# Patient Record
Sex: FEMALE | Race: WHITE | Employment: OTHER | URBAN - METROPOLITAN AREA
[De-identification: names, ages, dates, MRNs, and addresses within clinical notes are randomized per-mention and may not be internally consistent; named-entity substitution may affect disease eponyms.]

---

## 2023-06-02 ENCOUNTER — LAB REQUISITION (OUTPATIENT)
Dept: LAB | Facility: CLINIC | Age: 78
End: 2023-06-02
Payer: COMMERCIAL

## 2023-06-02 DIAGNOSIS — Z79.899 OTHER LONG TERM (CURRENT) DRUG THERAPY: ICD-10-CM

## 2023-06-02 LAB
ALBUMIN SERPL BCG-MCNC: 4.6 G/DL (ref 3.5–5.2)
ALP SERPL-CCNC: 70 U/L (ref 35–104)
ALT SERPL W P-5'-P-CCNC: 24 U/L (ref 10–35)
AST SERPL W P-5'-P-CCNC: 33 U/L (ref 10–35)
BASOPHILS # BLD AUTO: 0.1 10E3/UL (ref 0–0.2)
BASOPHILS NFR BLD AUTO: 1 %
BILIRUB DIRECT SERPL-MCNC: 0.25 MG/DL (ref 0–0.3)
BILIRUB SERPL-MCNC: 1.1 MG/DL
EOSINOPHIL # BLD AUTO: 0.4 10E3/UL (ref 0–0.7)
EOSINOPHIL NFR BLD AUTO: 5 %
ERYTHROCYTE [DISTWIDTH] IN BLOOD BY AUTOMATED COUNT: 13.9 % (ref 10–15)
HCT VFR BLD AUTO: 37.3 % (ref 35–47)
HGB BLD-MCNC: 12.4 G/DL (ref 11.7–15.7)
IMM GRANULOCYTES # BLD: 0 10E3/UL
IMM GRANULOCYTES NFR BLD: 0 %
LYMPHOCYTES # BLD AUTO: 2.1 10E3/UL (ref 0.8–5.3)
LYMPHOCYTES NFR BLD AUTO: 30 %
MCH RBC QN AUTO: 31.6 PG (ref 26.5–33)
MCHC RBC AUTO-ENTMCNC: 33.2 G/DL (ref 31.5–36.5)
MCV RBC AUTO: 95 FL (ref 78–100)
MONOCYTES # BLD AUTO: 0.5 10E3/UL (ref 0–1.3)
MONOCYTES NFR BLD AUTO: 8 %
NEUTROPHILS # BLD AUTO: 3.9 10E3/UL (ref 1.6–8.3)
NEUTROPHILS NFR BLD AUTO: 56 %
NRBC # BLD AUTO: 0 10E3/UL
NRBC BLD AUTO-RTO: 0 /100
PLATELET # BLD AUTO: 255 10E3/UL (ref 150–450)
PROT SERPL-MCNC: 6.6 G/DL (ref 6.4–8.3)
RBC # BLD AUTO: 3.92 10E6/UL (ref 3.8–5.2)
WBC # BLD AUTO: 7 10E3/UL (ref 4–11)

## 2023-06-02 PROCEDURE — 80076 HEPATIC FUNCTION PANEL: CPT | Mod: ORL | Performed by: DERMATOLOGY

## 2023-06-02 PROCEDURE — 85025 COMPLETE CBC W/AUTO DIFF WBC: CPT | Mod: ORL | Performed by: DERMATOLOGY

## 2024-01-15 ENCOUNTER — PRE VISIT (OUTPATIENT)
Dept: NEUROLOGY | Facility: CLINIC | Age: 79
End: 2024-01-15

## 2024-03-20 ENCOUNTER — TRANSCRIBE ORDERS (OUTPATIENT)
Dept: OTHER | Age: 79
End: 2024-03-20

## 2024-03-20 DIAGNOSIS — G60.8 PERIPHERAL SENSORY NEUROPATHY: ICD-10-CM

## 2024-03-20 DIAGNOSIS — R26.9 GAIT ABNORMALITY: ICD-10-CM

## 2024-03-20 DIAGNOSIS — R26.89 BALANCE PROBLEM: Primary | ICD-10-CM

## 2024-03-20 DIAGNOSIS — R42 VERTIGO: ICD-10-CM

## 2024-05-25 ENCOUNTER — HEALTH MAINTENANCE LETTER (OUTPATIENT)
Age: 79
End: 2024-05-25

## 2024-06-18 NOTE — TELEPHONE ENCOUNTER
Records Requested     June 18, 2024 9:36 AM   [id number]   Facility  [facility]   Outcome 9:37 am Sent request for imaging to be pushed to PACS. -MIRELA Steinberg on 7/10/2024 at 10:54 AM Imaging resolved into PACS. -MIRELA     RECORDS RECEIVED FROM: [facility]   REASON FOR VISIT: Vertigo, Balance problems   PROVIDER: Mk Lazo DO   DATE OF APPT: 8/16/24 @ 10:00 am    NOTES (FOR ALL VISITS) STATUS DETAILS   OFFICE NOTE from referring provider [facility] 3/19/24, 9/29/23, 6/21/23 Kacie Phoenix MD @[facility]     MEDICATION LIST Internal    IMAGING  (FOR ALL VISITS)     CT (HEAD, NECK, SPINE) PACS [facility]  3/19/24 CT Sinus [facility]

## 2024-07-18 ENCOUNTER — TRANSFERRED RECORDS (OUTPATIENT)
Dept: HEALTH INFORMATION MANAGEMENT | Facility: CLINIC | Age: 79
End: 2024-07-18

## 2024-08-06 ENCOUNTER — TRANSFERRED RECORDS (OUTPATIENT)
Dept: HEALTH INFORMATION MANAGEMENT | Facility: CLINIC | Age: 79
End: 2024-08-06
Payer: COMMERCIAL

## 2024-08-16 ENCOUNTER — OFFICE VISIT (OUTPATIENT)
Dept: NEUROLOGY | Facility: CLINIC | Age: 79
End: 2024-08-16
Payer: COMMERCIAL

## 2024-08-16 ENCOUNTER — LAB (OUTPATIENT)
Dept: LAB | Facility: CLINIC | Age: 79
End: 2024-08-16
Payer: COMMERCIAL

## 2024-08-16 ENCOUNTER — PRE VISIT (OUTPATIENT)
Dept: NEUROLOGY | Facility: CLINIC | Age: 79
End: 2024-08-16

## 2024-08-16 VITALS
OXYGEN SATURATION: 98 % | WEIGHT: 122.2 LBS | HEART RATE: 66 BPM | SYSTOLIC BLOOD PRESSURE: 136 MMHG | DIASTOLIC BLOOD PRESSURE: 81 MMHG

## 2024-08-16 DIAGNOSIS — G25.81 RESTLESS LEGS SYNDROME (RLS): ICD-10-CM

## 2024-08-16 DIAGNOSIS — G62.9 NEUROPATHY: ICD-10-CM

## 2024-08-16 DIAGNOSIS — R79.9 ABNORMAL FINDING OF BLOOD CHEMISTRY, UNSPECIFIED: ICD-10-CM

## 2024-08-16 DIAGNOSIS — M20.41 HAMMER TOES OF BOTH FEET: ICD-10-CM

## 2024-08-16 DIAGNOSIS — R42 VERTIGO: ICD-10-CM

## 2024-08-16 DIAGNOSIS — R26.9 GAIT ABNORMALITY: ICD-10-CM

## 2024-08-16 DIAGNOSIS — G60.3 IDIOPATHIC PROGRESSIVE NEUROPATHY: ICD-10-CM

## 2024-08-16 DIAGNOSIS — R26.89 BALANCE PROBLEM: ICD-10-CM

## 2024-08-16 DIAGNOSIS — R27.8 OTHER LACK OF COORDINATION: ICD-10-CM

## 2024-08-16 DIAGNOSIS — G60.8 PERIPHERAL SENSORY NEUROPATHY: ICD-10-CM

## 2024-08-16 DIAGNOSIS — G61.1 SERUM NEUROPATHY (H): ICD-10-CM

## 2024-08-16 DIAGNOSIS — G62.9 NEUROPATHY: Primary | ICD-10-CM

## 2024-08-16 DIAGNOSIS — M20.42 HAMMER TOES OF BOTH FEET: ICD-10-CM

## 2024-08-16 LAB
BASOPHILS # BLD AUTO: 0 10E3/UL (ref 0–0.2)
BASOPHILS NFR BLD AUTO: 1 %
EOSINOPHIL # BLD AUTO: 0.2 10E3/UL (ref 0–0.7)
EOSINOPHIL NFR BLD AUTO: 3 %
ERYTHROCYTE [DISTWIDTH] IN BLOOD BY AUTOMATED COUNT: 15.2 % (ref 10–15)
FERRITIN SERPL-MCNC: 148 NG/ML (ref 11–328)
HCT VFR BLD AUTO: 40.6 % (ref 35–47)
HGB BLD-MCNC: 14.2 G/DL (ref 11.7–15.7)
IMM GRANULOCYTES # BLD: 0 10E3/UL
IMM GRANULOCYTES NFR BLD: 0 %
IRON BINDING CAPACITY (ROCHE): 379 UG/DL (ref 240–430)
IRON SATN MFR SERPL: 29 % (ref 15–46)
IRON SERPL-MCNC: 110 UG/DL (ref 37–145)
LYMPHOCYTES # BLD AUTO: 1.8 10E3/UL (ref 0.8–5.3)
LYMPHOCYTES NFR BLD AUTO: 29 %
MCH RBC QN AUTO: 31.8 PG (ref 26.5–33)
MCHC RBC AUTO-ENTMCNC: 35 G/DL (ref 31.5–36.5)
MCV RBC AUTO: 91 FL (ref 78–100)
MONOCYTES # BLD AUTO: 0.5 10E3/UL (ref 0–1.3)
MONOCYTES NFR BLD AUTO: 8 %
NEUTROPHILS # BLD AUTO: 3.6 10E3/UL (ref 1.6–8.3)
NEUTROPHILS NFR BLD AUTO: 59 %
NRBC # BLD AUTO: 0 10E3/UL
NRBC BLD AUTO-RTO: 0 /100
PLATELET # BLD AUTO: 253 10E3/UL (ref 150–450)
RBC # BLD AUTO: 4.46 10E6/UL (ref 3.8–5.2)
TOTAL PROTEIN SERUM FOR ELP: 7.1 G/DL (ref 6.4–8.3)
WBC # BLD AUTO: 6.1 10E3/UL (ref 4–11)

## 2024-08-16 PROCEDURE — 86334 IMMUNOFIX E-PHORESIS SERUM: CPT | Mod: 26 | Performed by: PATHOLOGY

## 2024-08-16 PROCEDURE — 84165 PROTEIN E-PHORESIS SERUM: CPT | Mod: TC | Performed by: PATHOLOGY

## 2024-08-16 PROCEDURE — 99205 OFFICE O/P NEW HI 60 MIN: CPT | Performed by: PSYCHIATRY & NEUROLOGY

## 2024-08-16 PROCEDURE — 84165 PROTEIN E-PHORESIS SERUM: CPT | Mod: 26 | Performed by: PATHOLOGY

## 2024-08-16 PROCEDURE — 85025 COMPLETE CBC W/AUTO DIFF WBC: CPT | Performed by: PATHOLOGY

## 2024-08-16 PROCEDURE — 83540 ASSAY OF IRON: CPT | Performed by: PATHOLOGY

## 2024-08-16 PROCEDURE — 36415 COLL VENOUS BLD VENIPUNCTURE: CPT | Performed by: PATHOLOGY

## 2024-08-16 PROCEDURE — 84207 ASSAY OF VITAMIN B-6: CPT | Mod: 90 | Performed by: PATHOLOGY

## 2024-08-16 PROCEDURE — 83550 IRON BINDING TEST: CPT | Performed by: PATHOLOGY

## 2024-08-16 PROCEDURE — 99000 SPECIMEN HANDLING OFFICE-LAB: CPT | Performed by: PATHOLOGY

## 2024-08-16 PROCEDURE — 84155 ASSAY OF PROTEIN SERUM: CPT | Performed by: PATHOLOGY

## 2024-08-16 PROCEDURE — 84425 ASSAY OF VITAMIN B-1: CPT | Mod: 90 | Performed by: PATHOLOGY

## 2024-08-16 PROCEDURE — 86334 IMMUNOFIX E-PHORESIS SERUM: CPT | Performed by: PATHOLOGY

## 2024-08-16 PROCEDURE — 82728 ASSAY OF FERRITIN: CPT | Performed by: PATHOLOGY

## 2024-08-16 RX ORDER — GABAPENTIN 100 MG/1
CAPSULE ORAL
COMMUNITY

## 2024-08-16 RX ORDER — ASPIRIN 81 MG/1
TABLET ORAL
COMMUNITY

## 2024-08-16 RX ORDER — AMLODIPINE BESYLATE 5 MG/1
TABLET ORAL
COMMUNITY

## 2024-08-16 ASSESSMENT — PAIN SCALES - GENERAL: PAINLEVEL: NO PAIN (0)

## 2024-08-16 NOTE — PATIENT INSTRUCTIONS
I think you do have a neuropathy and it is leading to imbalance when walking.  It may be contributing to sensory changes, and restlessness in your feet.  However, I do not think it would cause any weakness.  The recurrent weakness you experience in the legs is from another type of nerve injury, often occurring where the nerves exit the spine and get pinched by discs or bones.  I think an EMG can help you define your neuropathy and the presence of a root injury (nerve pinches).    Your exam today is not consistent with MS.  Your prior reported imaging is also not consistent with this disorder. I would ask that you obtain your outside imaging and give it to our records and registration department for my review upon, especially the thoracic imaging you obtained recently.

## 2024-08-16 NOTE — PROGRESS NOTES
[facility] Neurology Consultation    Leola Marks MRN# [id number]   Age: 79 year old YOB: 1945     Requesting physician: Kacie Vasquez     Reason for Consultation: dizziness      History of Presenting Symptoms:   Leola Marks is a 79 year old female who presents today for evaluation of dizziness.  The patient has a pertinent medical history of HTN, HLD, b/l SNHL w/tinnitus and hearing aides, lumbar DDD with prior suspicion of L4/5 radiculopathy, and TIA (12/9/2020, paresthesias of right arm/face. [facility] IM provider outpatient. MRI brain normal (no white matter lesions suspicious for MS. 2/16/2021 US carotids <50% stenosis b/l).      The patient was seen 3/19/2024 with an IM provider for a feeling of dizziness happening on/off for years.  She has previously been seen with PT providers for gait issues going on for years. She was also noted to have a right foot neuropathy, which was with numbness but no loss of strength.  During this visit, it was mentioned she was seen with a neurology provider in 2023 and had an EMG for right arm tingling.  In chart review, a [facility] visit occurred most recently 8/8/2023.  They mention she had two episodes of wrist drop upon waking in the past, but that an EMG was normal (8/5/2023).  Neuro exam was said to be normal. She then went to [facility] for gait and balance issues, and has attended PT for these issues.  She then reports having an MRI thoracic spine, but is uncertain of the results.    Today, the patient indicates she has a gait problem and neuropathy in her feet.  This is her primary concern.  She has never had an EMG of her legs.  Balance issues have been present for 3-5 years, and worsening over time.  She feels balance issues are not acute onset spinning or nausea/vomiting.  Balance issues are feeling as if she may fall when walking.  She has had no falls.  She uses a rail when doing stairs, otherwise she feels  uncomfortable.  Standing for prolonged periods make her feel off balance, (not light headed). She has started using sticks to walk longer distances.  She hasn't noticed issues of imbalance in the shower, or when bending over to pick things up.  She has no clumsiness of her hands or speech.      She does mention she has had acute onset incontinence. She was seen with outside urology providers who told her she had MS as a diagnosis.  This was quite worrisome for her and has led to a great deal of distress. She has been self catheterizing for her urinary incontinence.      Social History:   Retired, used to work as a nurse  with [employer].      Medications:   Estradiol  Losartan-hydrochlorothiazide     Physical Exam:   Vitals: /81   Pulse 66   Wt 55.4 kg (122 lb 3.2 oz)   SpO2 98%    General: Seated comfortably in no acute distress.  HEENT: Optic discs sharp and vasculature normal on funduscopic exam.   Musculoskeletal: hammer toes b/l  Neurologic:     Mental Status: Fully alert, attentive and oriented. Speech clear and fluent, no paraphasic errors.      Cranial Nerves: Visual fields intact. PERRL. EOMI with normal smooth pursuit. Facial sensation intact/symmetric. Facial movements symmetric. Hearing not formally tested but intact to conversation. Palate elevation symmetric, uvula midline. No dysarthria. Shoulder shrug strong bilaterally. Tongue protrusion midline.     Motor: No tremors or other abnormal movements observed. Muscle tone normal throughout. No pronator drift. Normal/symmetric rapid finger tapping. Strength 5/5 throughout upper and lower extremities.     Deep Tendon Reflexes: 2+/symmetric throughout upper and patellar. Absent achilles b/l. No clonus. Toes downgoing bilaterally.     Sensory: loss of vibration in toes and MM b/l, but present past 10+ seconds in mid-leg.  Pinprick and light touch sensation inaccurate in reporting in great toes and soles of feet b/l. Proprioception with  "errors in reporting b/l in great toes (2/4 correct responses). positive Romberg.      Coordination: Finger-nose-finger and heel-shin intact without dysmetria. Rapid alternating movements intact/symmetric with normal speed and rhythm.     Gait: Hammar toes b/l.          Data: Pertinent prior to visit   Imaging:  MRI brain and MRA head: 2023:  - No acute ischemia or other acute intracranial pathology  - No mass or pathological intracranial enhancement  - mild chronic microvascular ischemic changes  - No flow-limiting stenosis involving the major cervical arteries    MRI brain/MRA head and neck: 2020 (listed in chart as MR soft tissue neck)  IMPRESSION:   MRI brain:   1. No acute intracranial abnormality.   2. Mild chronic microvascular ischemic changes and mild diffuse cerebral  volume loss.   MRA head/neck:   1. No significant narrowing of the visualized intracranial arteries.   2. Mild irregularity of the proximal cervical internal carotid arteries  without significant luminal narrowing.     MRI lumbar spine: 2021:  \"1. Transitional lumbosacral anatomy. Spine labeling and key images should be reviewed prior to any intervention.    2. Moderate canal stenosis at L4-5 secondary to anterolisthesis, degenerative disc disease, and facet arthropathy.   3. Multilevel foraminal stenosis, severe on the left at L5-S1, moderate on the right at L2-L3, and mild-to-moderate bilaterally at L4-5. \"    Procedures:  EM2023: RUE alone  - This is an unremarkable study.  There is no electrodiagnostic evidence to support a median neuropathy across the right carpal tunnel, right ulnar neuropathy, or right radial neuropathy.    Laboratory:  3/19/2024:  B12, folic acid, TSH, A1c, BMP ~ normal         Assessment and Plan:   Assessment:  RLS  Distal symmetric polyneuropathy w/sensorimotor ataxia  Suspect recurrent L4 radiculopathy, right    The patient's imaging (by report) is not characteristic for central nervous " system injury, and we did discuss today that she was unlikely to have MS as imaging findings are needed for the diagnosis.  Her imbalance difficulties are likely a result of her distal symmetric polyneuropathy seen on exam today, leading to sensorimotor ataxia in the lower extremities.  She has no other clear findings of ataxia.  Given her reports of transient foot drop in either leg, I think she may benefit from having an EMG to both define her neruoapthy severity as well as to look for peroneal insult or radicular insult at L4/5.      Plan:  EMG of the lower body  SPEP, Immunofixation, B1, B6, Urine heavy metals, CBC, Iron and iron binding, ferritin  Podiatry referral for hammer toe    Follow up in Neurology clinic in 3-6 months, or should new concerns arise.    SAMUEL Lazo D.O.   of Neurology    Total time today (65 min) in this patient encounter was spent on pre-charting, counseling and/or coordination of care.  The patient is in agreement with this plan and has no further questions.

## 2024-08-16 NOTE — LETTER
8/16/2024       RE: Leola Marks  [address]  [address]  [place] [id number]     Dear Colleague,    Thank you for referring your patient, Leola Marks, to the [facility]. Please see a copy of my visit note below.    [facility] Neurology Consultation    Leola Marks MRN# [id number]   Age: 79 year old YOB: 1945     Requesting physician: Kacie Vasquez     Reason for Consultation: dizziness      History of Presenting Symptoms:   Leola Marks is a 79 year old female who presents today for evaluation of dizziness.  The patient has a pertinent medical history of HTN, HLD, b/l SNHL w/tinnitus and hearing aides, lumbar DDD with prior suspicion of L4/5 radiculopathy, and TIA (12/9/2020, paresthesias of right arm/face. [facility] IM provider outpatient. MRI brain normal (no white matter lesions suspicious for MS. 2/16/2021 US carotids <50% stenosis b/l).      The patient was seen 3/19/2024 with an IM provider for a feeling of dizziness happening on/off for years.  She has previously been seen with PT providers for gait issues going on for years. She was also noted to have a right foot neuropathy, which was with numbness but no loss of strength.  During this visit, it was mentioned she was seen with a neurology provider in 2023 and had an EMG for right arm tingling.  In chart review, a [facility] visit occurred most recently 8/8/2023.  They mention she had two episodes of wrist drop upon waking in the past, but that an EMG was normal (8/5/2023).  Neuro exam was said to be normal. She then went to [facility] spine for gait and balance issues, and has attended PT for these issues.  She then reports having an MRI thoracic spine, but is uncertain of the results.    Today, the patient indicates she has a gait problem and neuropathy in her feet.  This is her primary concern.  She has  never had an EMG of her legs.  Balance issues have been present for 3-5 years, and worsening over time.  She feels balance issues are not acute onset spinning or nausea/vomiting.  Balance issues are feeling as if she may fall when walking.  She has had no falls.  She uses a rail when doing stairs, otherwise she feels uncomfortable.  Standing for prolonged periods make her feel off balance, (not light headed). She has started using sticks to walk longer distances.  She hasn't noticed issues of imbalance in the shower, or when bending over to pick things up.  She has no clumsiness of her hands or speech.      She does mention she has had acute onset incontinence. She was seen with outside urology providers who told her she had MS as a diagnosis.  This was quite worrisome for her and has led to a great deal of distress. She has been self catheterizing for her urinary incontinence.      Social History:   Retired, used to work as a nurse  with [employer].      Medications:   Estradiol  Losartan-hydrochlorothiazide     Physical Exam:   Vitals: /81   Pulse 66   Wt 55.4 kg (122 lb 3.2 oz)   SpO2 98%    General: Seated comfortably in no acute distress.  HEENT: Optic discs sharp and vasculature normal on funduscopic exam.   Musculoskeletal: hammer toes b/l  Neurologic:     Mental Status: Fully alert, attentive and oriented. Speech clear and fluent, no paraphasic errors.      Cranial Nerves: Visual fields intact. PERRL. EOMI with normal smooth pursuit. Facial sensation intact/symmetric. Facial movements symmetric. Hearing not formally tested but intact to conversation. Palate elevation symmetric, uvula midline. No dysarthria. Shoulder shrug strong bilaterally. Tongue protrusion midline.     Motor: No tremors or other abnormal movements observed. Muscle tone normal throughout. No pronator drift. Normal/symmetric rapid finger tapping. Strength 5/5 throughout upper and lower extremities.     Deep Tendon Reflexes:  "2+/symmetric throughout upper and patellar. Absent achilles b/l. No clonus. Toes downgoing bilaterally.     Sensory: loss of vibration in toes and MM b/l, but present past 10+ seconds in mid-leg.  Pinprick and light touch sensation inaccurate in reporting in great toes and soles of feet b/l. Proprioception with errors in reporting b/l in great toes (2/4 correct responses). positive Romberg.      Coordination: Finger-nose-finger and heel-shin intact without dysmetria. Rapid alternating movements intact/symmetric with normal speed and rhythm.     Gait: Hammar toes b/l.          Data: Pertinent prior to visit   Imaging:  MRI brain and MRA head: 2023:  - No acute ischemia or other acute intracranial pathology  - No mass or pathological intracranial enhancement  - mild chronic microvascular ischemic changes  - No flow-limiting stenosis involving the major cervical arteries    MRI brain/MRA head and neck: 2020 (listed in chart as MR soft tissue neck)  IMPRESSION:   MRI brain:   1. No acute intracranial abnormality.   2. Mild chronic microvascular ischemic changes and mild diffuse cerebral  volume loss.   MRA head/neck:   1. No significant narrowing of the visualized intracranial arteries.   2. Mild irregularity of the proximal cervical internal carotid arteries  without significant luminal narrowing.     MRI lumbar spine: 2021:  \"1. Transitional lumbosacral anatomy. Spine labeling and key images should be reviewed prior to any intervention.    2. Moderate canal stenosis at L4-5 secondary to anterolisthesis, degenerative disc disease, and facet arthropathy.   3. Multilevel foraminal stenosis, severe on the left at L5-S1, moderate on the right at L2-L3, and mild-to-moderate bilaterally at L4-5. \"    Procedures:  EM2023: RUE alone  - This is an unremarkable study.  There is no electrodiagnostic evidence to support a median neuropathy across the right carpal tunnel, right ulnar neuropathy, or right " radial neuropathy.    Laboratory:  3/19/2024:  B12, folic acid, TSH, A1c, BMP ~ normal         Assessment and Plan:   Assessment:  RLS  Distal symmetric polyneuropathy w/sensorimotor ataxia  Suspect recurrent L4 radiculopathy, right    The patient's imaging (by report) is not characteristic for central nervous system injury, and we did discuss today that she was unlikely to have MS as imaging findings are needed for the diagnosis.  Her imbalance difficulties are likely a result of her distal symmetric polyneuropathy seen on exam today, leading to sensorimotor ataxia in the lower extremities.  She has no other clear findings of ataxia.  Given her reports of transient foot drop in either leg, I think she may benefit from having an EMG to both define her neruoapthy severity as well as to look for peroneal insult or radicular insult at L4/5.      Plan:  EMG of the lower body  SPEP, Immunofixation, B1, B6, Urine heavy metals, CBC, Iron and iron binding, ferritin  Podiatry referral for hammer toe    Follow up in Neurology clinic in 3-6 months, or should new concerns arise.    SAMUEL Lazo D.O.   of Neurology    Total time today (65 min) in this patient encounter was spent on pre-charting, counseling and/or coordination of care.  The patient is in agreement with this plan and has no further questions.      Again, thank you for allowing me to participate in the care of your patient.      Sincerely,    Mk Lazo, DO

## 2024-08-19 ENCOUNTER — DOCUMENTATION ONLY (OUTPATIENT)
Dept: NEUROLOGY | Facility: CLINIC | Age: 79
End: 2024-08-19

## 2024-08-19 ENCOUNTER — DOCUMENTATION ONLY (OUTPATIENT)
Dept: NEUROLOGY | Facility: CLINIC | Age: 79
End: 2024-08-19
Payer: COMMERCIAL

## 2024-08-19 LAB — PYRIDOXAL PHOS SERPL-SCNC: 220.6 NMOL/L

## 2024-08-19 NOTE — PROGRESS NOTES
MRI Lumbar, MR Cervical and   MRI Brain reports from [facility] have been received and scanned to chart.

## 2024-08-20 LAB
ALBUMIN SERPL ELPH-MCNC: 4.9 G/DL (ref 3.7–5.1)
ALPHA1 GLOB SERPL ELPH-MCNC: 0.3 G/DL (ref 0.2–0.4)
ALPHA2 GLOB SERPL ELPH-MCNC: 0.5 G/DL (ref 0.5–0.9)
B-GLOBULIN SERPL ELPH-MCNC: 0.7 G/DL (ref 0.6–1)
GAMMA GLOB SERPL ELPH-MCNC: 0.6 G/DL (ref 0.7–1.6)
LOCATION OF TASK: ABNORMAL
LOCATION OF TASK: NORMAL
M PROTEIN SERPL ELPH-MCNC: 0 G/DL
PROT PATTERN SERPL ELPH-IMP: ABNORMAL
PROT PATTERN SERPL IFE-IMP: NORMAL

## 2024-08-22 LAB — VIT B1 PYROPHOSHATE BLD-SCNC: 140 NMOL/L

## 2024-08-30 ENCOUNTER — APPOINTMENT (OUTPATIENT)
Dept: LAB | Facility: CLINIC | Age: 79
End: 2024-08-30
Payer: COMMERCIAL

## 2024-09-02 LAB
ARSENIC 24H UR-MRATE: NORMAL UG/D
ARSENIC UR-MCNC: <10 UG/L
ARSENIC/CREAT UR: NORMAL UG/G CRT
CREAT 24H UR-MRATE: 875 MG/D
CREAT UR-MCNC: 25 MG/DL
LEAD 24H UR-MRATE: NORMAL UG/D
LEAD UR-MCNC: <5 UG/L
LEAD/CREAT UR: NORMAL UG/G CRT
MERCURY 24H UR-MRATE: NORMAL UG/D
MERCURY UR-MCNC: <2.5 UG/L
MERCURY/CREAT UR: NORMAL UG/G CRT

## 2024-09-13 ENCOUNTER — OFFICE VISIT (OUTPATIENT)
Dept: PODIATRY | Facility: CLINIC | Age: 79
End: 2024-09-13
Attending: PSYCHIATRY & NEUROLOGY
Payer: COMMERCIAL

## 2024-09-13 ENCOUNTER — ANCILLARY PROCEDURE (OUTPATIENT)
Dept: GENERAL RADIOLOGY | Facility: CLINIC | Age: 79
End: 2024-09-13
Attending: PODIATRIST
Payer: COMMERCIAL

## 2024-09-13 VITALS — WEIGHT: 122 LBS | BODY MASS INDEX: 21.62 KG/M2 | HEIGHT: 63 IN

## 2024-09-13 DIAGNOSIS — M20.41 HAMMER TOES OF BOTH FEET: ICD-10-CM

## 2024-09-13 DIAGNOSIS — M20.11 HAV (HALLUX ABDUCTO VALGUS), RIGHT: Primary | ICD-10-CM

## 2024-09-13 DIAGNOSIS — M20.12 HAV (HALLUX ABDUCTO VALGUS), LEFT: ICD-10-CM

## 2024-09-13 DIAGNOSIS — M20.42 HAMMER TOES OF BOTH FEET: ICD-10-CM

## 2024-09-13 DIAGNOSIS — M20.11 HAV (HALLUX ABDUCTO VALGUS), RIGHT: ICD-10-CM

## 2024-09-13 PROCEDURE — 73630 X-RAY EXAM OF FOOT: CPT | Mod: TC | Performed by: RADIOLOGY

## 2024-09-13 PROCEDURE — 99204 OFFICE O/P NEW MOD 45 MIN: CPT | Performed by: PODIATRIST

## 2024-09-13 NOTE — PROGRESS NOTES
PATIENT HISTORY:  Dr. Lazo requested I see this patient for their foot issue.  Leola Marks is a 79 year old female who presents to clinic for bunions and hammertoes.  Notes that she has had them all her life.  She does have neuropathy and some balance issues and there is a concern that the bunions and hammertoes may be causing this.  She states that she has wide shoes which do help her not to have pain.  She is wondering what and if anything can be done for the bunions and hammertoes.    Review of Systems:  Patient denies fever, chills, rash, wound, stiffness, limping, weakness, heart burn, blood in stool, chest pain with activity, calf pain when walking, shortness of breath with activity, chronic cough, easy bleeding/bruising, swelling of ankles, excessive thirst, fatigue, depression, anxiety.  Patient admits to numbness.     PAST MEDICAL HISTORY:   Past Medical History:   Diagnosis Date    HTN     Menopausal and postmenopausal disorder     Nummular eczema         PAST SURGICAL HISTORY:   Past Surgical History:   Procedure Laterality Date    HYSTERECTOMY, PAP NO LONGER INDICATED  1996    fibroids, cervix not palpable    LAMINECT/DISCECTOMY, LUMBAR  2003    SALPINGO OOPHORECTOMY,R/L/WEN  1996        MEDICATIONS:   Current Outpatient Medications:     amLODIPine (NORVASC) 5 MG tablet, , Disp: , Rfl:     aspirin 81 MG EC tablet, Take 1 tablet every day by oral route., Disp: , Rfl:     CALCITRATE/VITAMIN D 315-200 MG-UNIT OR TABS, 1 tab daily, Disp: , Rfl:     cephALEXin (KEFLEX) 250 MG capsule, take one capsule 250 mg PO daily, Disp: , Rfl:     estradiol (ESTRACE) 0.5 MG tablet, Take 1 tablet by mouth daily., Disp: 90 tablet, Rfl: 3    gabapentin (NEURONTIN) 100 MG capsule, , Disp: , Rfl:     losartan-hydrochlorothiazide (HYZAAR) 100-12.5 MG per tablet, Take 1 tablet by mouth daily., Disp: 90 tablet, Rfl: 3    MULTI-VITAMIN OR TABS, 1 TABLET DAILY, Disp: , Rfl:     VITAMIN C 500 MG OR TABS, ONE TABLET  DAILY prn, Disp: 3 MONTHS, Rfl: 1 YEAR     ALLERGIES:    Allergies   Allergen Reactions    Lisinopril Cough     Retains fluids  & migraines         SOCIAL HISTORY:   Social History     Socioeconomic History    Marital status: Single     Spouse name: Not on file    Number of children: Not on file    Years of education: Not on file    Highest education level: Not on file   Occupational History    Not on file   Tobacco Use    Smoking status: Never    Smokeless tobacco: Never   Substance and Sexual Activity    Alcohol use: Yes     Comment: 2 glasses of wine per day    Drug use: No    Sexual activity: Not Currently   Other Topics Concern     Service No    Blood Transfusions No    Caffeine Concern No    Occupational Exposure No    Hobby Hazards No    Sleep Concern No    Stress Concern Yes    Weight Concern Yes    Special Diet Yes    Back Care No    Exercise Yes    Bike Helmet Yes    Seat Belt Yes    Self-Exams Yes    Parent/sibling w/ CABG, MI or angioplasty before 65F 55M? Not Asked   Social History Narrative    Not on file     Social Determinants of Health     Financial Resource Strain: Low Risk  (5/24/2024)    [facility]    Financial Resource Strain     Difficulty of Paying Living Expenses: 3     Difficulty of Paying Living Expenses: Not on file   Food Insecurity: No Food Insecurity (5/24/2024)    Received from [employer]    Food Insecurity     Worried About Running Out of Food in the Last Year: 1   Transportation Needs: No Transportation Needs (5/24/2024)    Received from [employer]    Transportation Needs     Lack of Transportation (Medical): 1   Physical Activity: Not on file   Stress: Not on file   Social Connections: Socially Integrated (5/24/2024)    Received from [facility]    Social Connections     Frequency of Communication with Friends and Family: 0  "  Interpersonal Safety: Not on file   Housing Stability: Low Risk  (5/24/2024)    Received from [facility]    Housing Stability     Unable to Pay for Housing in the Last Year: 1        FAMILY HISTORY:   Family History   Problem Relation Age of Onset    Arthritis Sister     Respiratory Sister         COPD    Breast Cancer Sister         age 68    Asthma Father     Diabetes Father     Hypertension Mother         EXAM:Vitals: Ht 1.588 m (5' 2.5\")   Wt 55.3 kg (122 lb)   BMI 21.96 kg/m    BMI= Body mass index is 21.96 kg/m .    General appearance: Patient is alert and fully cooperative with history & exam.  No sign of distress is noted during the visit.     Psychiatric: Affect is pleasant & appropriate.  Patient appears motivated to improve health.     Respiratory: Breathing is regular & unlabored while sitting.     HEENT: Hearing is intact to spoken word.  Speech is clear.  No gross evidence of visual impairment that would impact ambulation.     Dermatologic: Skin is intact to both lower extremities without significant lesions, rash or abrasion.  No paronychia or evidence of soft tissue infection is noted.     Vascular: DP & PT pulses are intact & regular bilaterally.  No significant edema or varicosities noted.  CFT and skin temperature is normal to both lower extremities.     Neurologic: Lower extremity sensation is intact to light touch.  No evidence of weakness or contracture in the lower extremities.  No evidence of neuropathy.     Musculoskeletal: Patient is ambulatory without assistive device or brace.  Prominent first metatarsal heads medially both feet with lateral deviation of the great toes bilaterally.  Semiflexible contractures of toes 2 through 4 both feet.    Radiographs: Foot x-rays bilaterally - I personally reviewed the xrays.  Increase in the first and second intermetatarsal angles with tibial sesamoid positions of 6 both feet.  Cystic changes to the heads of the " first metatarsals are noted.  No fractures are noted.  Some osteopenia noted on x-ray.     ASSESSMENT:    Hammer toes of both feet  Hav (hallux abducto valgus), right  Hav (hallux abducto valgus), left     Medical Decision Making/Plan:  Reviewed [facility] in [facility]. Reviewed and discussed causes of bunion with patient.  Explained that it is in large part due to a patients foot type and how they walk.   Discussed treatment options with patient including orthotics, splints, pads, and shoe gear.  We discussed that sometimes cortisone injections can help with the pain or physical therapy treatments such as ultrasound to help with pain but does not fix the problem.  Discussed that this is normally a structural issue in the foot and if conservative therapy doesn't work, surgery is considered.  Distal osteotomy versus fusion.  With a distal osteotomy procedure, patient is normally minimally weight bearing in a cam boot for 6 weeks.  With fusion, patient is normally non weight bearing for 6 weeks.  With any surgery, patient needs to take the first 2 weeks off work for icing and elevating and could possible due seated work only for the remaining 4 weeks after that.  We discussed risks of surgery including infection, neuritis, non union, need for further surgery.    Reviewed and discussed causes of hammertoes with patient.  Explained that this can be caused by an overpowering of muscles or by the way we walk.  Discussed conservative treatments such as orthotics, pads, shoe gear.  Explained that sometimes the flexor tendons can be cut to try and straighten the toe and reduce rubbing. This is normally done in office and patient is weight bearing in postop she for 1-2 weeks.  We also discussed surgical intervention to remove the joint and possibly fuse the toe.  Normally patient has a pin sticking out of the toe for about 6 weeks and can not get the foot wet. Patient would have to be minimal weight bearing in cam boot.         Based on x-rays and clinical exam would recommend first metatarsal phalangeal joint fusion for her bunion correction.  Discussed that this would be a same-day procedure and she would be nonweightbearing for 1 month and minimal weightbearing in a boot for 6 weeks.  Talked about risks including infection, numbness, continued pain,  recurrence, need for further surgery, blood loss, blood clotting. You will scar.    She is going to hold off on surgery at this time.  She does not have much pain.  She will try bunion splints at night and she was given an order for custom orthotics.     All questions were answered to patient's satisfaction she will call further questions or concerns.      Patient risk factor: Patient is at low risk for infection.        Jess Moya DPM, Podiatry/Foot and Ankle Surgery

## 2024-09-13 NOTE — LETTER
9/13/2024      Leola Marks  [address]  [place] [address]      Dear Colleague,    Thank you for referring your patient, Leola Marks, to the [facility]. Please see a copy of my visit note below.    PATIENT HISTORY:  Dr. Lazo requested I see this patient for their foot issue.  Leola Marks is a 79 year old female who presents to clinic for bunions and hammertoes.  Notes that she has had them all her life.  She does have neuropathy and some balance issues and there is a concern that the bunions and hammertoes may be causing this.  She states that she has wide shoes which do help her not to have pain.  She is wondering what and if anything can be done for the bunions and hammertoes.    Review of Systems:  Patient denies fever, chills, rash, wound, stiffness, limping, weakness, heart burn, blood in stool, chest pain with activity, calf pain when walking, shortness of breath with activity, chronic cough, easy bleeding/bruising, swelling of ankles, excessive thirst, fatigue, depression, anxiety.  Patient admits to numbness.     PAST MEDICAL HISTORY:   Past Medical History:   Diagnosis Date     HTN      Menopausal and postmenopausal disorder      Nummular eczema         PAST SURGICAL HISTORY:   Past Surgical History:   Procedure Laterality Date     HYSTERECTOMY, PAP NO LONGER INDICATED  1996    fibroids, cervix not palpable     LAMINECT/DISCECTOMY, LUMBAR  2003     SALPINGO OOPHORECTOMY,R/L/WEN  1996        MEDICATIONS:   Current Outpatient Medications:      amLODIPine (NORVASC) 5 MG tablet, , Disp: , Rfl:      aspirin 81 MG EC tablet, Take 1 tablet every day by oral route., Disp: , Rfl:      CALCITRATE/VITAMIN D 315-200 MG-UNIT OR TABS, 1 tab daily, Disp: , Rfl:      cephALEXin (KEFLEX) 250 MG capsule, take one capsule 250 mg PO daily, Disp: , Rfl:      estradiol (ESTRACE) 0.5 MG tablet, Take 1 tablet by mouth daily., Disp: 90 tablet, Rfl: 3     gabapentin  (NEURONTIN) 100 MG capsule, , Disp: , Rfl:      losartan-hydrochlorothiazide (HYZAAR) 100-12.5 MG per tablet, Take 1 tablet by mouth daily., Disp: 90 tablet, Rfl: 3     MULTI-VITAMIN OR TABS, 1 TABLET DAILY, Disp: , Rfl:      VITAMIN C 500 MG OR TABS, ONE TABLET DAILY prn, Disp: 3 MONTHS, Rfl: 1 YEAR     ALLERGIES:    Allergies   Allergen Reactions     Lisinopril Cough     Retains fluids  & migraines         SOCIAL HISTORY:   Social History     Socioeconomic History     Marital status: Single     Spouse name: Not on file     Number of children: Not on file     Years of education: Not on file     Highest education level: Not on file   Occupational History     Not on file   Tobacco Use     Smoking status: Never     Smokeless tobacco: Never   Substance and Sexual Activity     Alcohol use: Yes     Comment: 2 glasses of wine per day     Drug use: No     Sexual activity: Not Currently   Other Topics Concern      Service No     Blood Transfusions No     Caffeine Concern No     Occupational Exposure No     Hobby Hazards No     Sleep Concern No     Stress Concern Yes     Weight Concern Yes     Special Diet Yes     Back Care No     Exercise Yes     Bike Helmet Yes     Seat Belt Yes     Self-Exams Yes     Parent/sibling w/ CABG, MI or angioplasty before 65F 55M? Not Asked   Social History Narrative     Not on file     Social Determinants of Health     Financial Resource Strain: Low Risk  (5/24/2024)    [facility]    Financial Resource Strain      Difficulty of Paying Living Expenses: 3      Difficulty of Paying Living Expenses: Not on file   Food Insecurity: No Food Insecurity (5/24/2024)    [facility]    Food Insecurity      Worried About Running Out of Food in the Last Year: 1   Transportation Needs: No Transportation Needs (5/24/2024)    [facility]    Transportation Needs     "  Lack of Transportation (Medical): 1   Physical Activity: Not on file   Stress: Not on file   Social Connections: Socially Integrated (5/24/2024)    Received from [employer] [facility]    Social Connections      Frequency of Communication with Friends and Family: 0   Interpersonal Safety: Not on file   Housing Stability: Low Risk  (5/24/2024)    Received from [facility]    Housing Stability      Unable to Pay for Housing in the Last Year: 1        FAMILY HISTORY:   Family History   Problem Relation Age of Onset     Arthritis Sister      Respiratory Sister         COPD     Breast Cancer Sister         age 68     Asthma Father      Diabetes Father      Hypertension Mother         EXAM:Vitals: Ht 1.588 m (5' 2.5\")   Wt 55.3 kg (122 lb)   BMI 21.96 kg/m    BMI= Body mass index is 21.96 kg/m .    General appearance: Patient is alert and fully cooperative with history & exam.  No sign of distress is noted during the visit.     Psychiatric: Affect is pleasant & appropriate.  Patient appears motivated to improve health.     Respiratory: Breathing is regular & unlabored while sitting.     HEENT: Hearing is intact to spoken word.  Speech is clear.  No gross evidence of visual impairment that would impact ambulation.     Dermatologic: Skin is intact to both lower extremities without significant lesions, rash or abrasion.  No paronychia or evidence of soft tissue infection is noted.     Vascular: DP & PT pulses are intact & regular bilaterally.  No significant edema or varicosities noted.  CFT and skin temperature is normal to both lower extremities.     Neurologic: Lower extremity sensation is intact to light touch.  No evidence of weakness or contracture in the lower extremities.  No evidence of neuropathy.     Musculoskeletal: Patient is ambulatory without assistive device or brace.  Prominent first metatarsal heads medially both feet with lateral deviation of the " great toes bilaterally.  Semiflexible contractures of toes 2 through 4 both feet.    Radiographs: Foot x-rays bilaterally - I personally reviewed the xrays.  Increase in the first and second intermetatarsal angles with tibial sesamoid positions of 6 both feet.  Cystic changes to the heads of the first metatarsals are noted.  No fractures are noted.  Some osteopenia noted on x-ray.     ASSESSMENT:    Hammer toes of both feet  Hav (hallux abducto valgus), right  Hav (hallux abducto valgus), left     Medical Decision Making/Plan:  Reviewed patient's chart in [facility]. Reviewed and discussed causes of bunion with patient.  Explained that it is in large part due to a patients foot type and how they walk.   Discussed treatment options with patient including orthotics, splints, pads, and shoe gear.  We discussed that sometimes cortisone injections can help with the pain or physical therapy treatments such as ultrasound to help with pain but does not fix the problem.  Discussed that this is normally a structural issue in the foot and if conservative therapy doesn't work, surgery is considered.  Distal osteotomy versus fusion.  With a distal osteotomy procedure, patient is normally minimally weight bearing in a cam boot for 6 weeks.  With fusion, patient is normally non weight bearing for 6 weeks.  With any surgery, patient needs to take the first 2 weeks off work for icing and elevating and could possible due seated work only for the remaining 4 weeks after that.  We discussed risks of surgery including infection, neuritis, non union, need for further surgery.    Reviewed and discussed causes of hammertoes with patient.  Explained that this can be caused by an overpowering of muscles or by the way we walk.  Discussed conservative treatments such as orthotics, pads, shoe gear.  Explained that sometimes the flexor tendons can be cut to try and straighten the toe and reduce rubbing. This is normally done in office and patient is  weight bearing in postop she for 1-2 weeks.  We also discussed surgical intervention to remove the joint and possibly fuse the toe.  Normally patient has a pin sticking out of the toe for about 6 weeks and can not get the foot wet. Patient would have to be minimal weight bearing in cam boot.        Based on x-rays and clinical exam would recommend first metatarsal phalangeal joint fusion for her bunion correction.  Discussed that this would be a same-day procedure and she would be nonweightbearing for 1 month and minimal weightbearing in a boot for 6 weeks.  Talked about risks including infection, numbness, continued pain,  recurrence, need for further surgery, blood loss, blood clotting. You will scar.    She is going to hold off on surgery at this time.  She does not have much pain.  She will try bunion splints at night and she was given an order for custom orthotics.     All questions were answered to patient's satisfaction she will call further questions or concerns.      Patient risk factor: Patient is at low risk for infection.        Jess Moya DPM, Podiatry/Foot and Ankle Surgery      Again, thank you for allowing me to participate in the care of your patient.        Sincerely,        Jess Moya DPM, Podiatry/Foot and Ankle Surgery

## 2024-09-13 NOTE — PATIENT INSTRUCTIONS
Thank you for choosing [facility] Podiatry / Foot & Ankle Surgery!    DR STAHL'S CLINIC:  [facility]   [address]   [place] [address]   (Tues, Wed, Thurs AM, Fri PM)       [facility]  [address], [place] [address]  (Fri AM)       TRIAGE LINE: [phone]  APPOINTMENTS: [phone]  RADIOLOGY: [phone]  SET UP SURGERY: [phone]  PHYSICAL THERAPY: [phone]   BILLING QUESTIONS: [phone]  FAX: [phone]       Follow up: as needed    Super feet green inserts    BUNION (HALLUX ABDUCTO VALGUS)  A bunion is caused by muscle imbalance. The great toe is pulled toward the smaller toes. The metatarsal head is pushed outward creating a lump on the side of your foot. Imbalance is the result of foot structure and instability.   Bunions do not improve with time. They usually enlarge, however this is a fairly slow process. Shoes do not necessarily cause bunions, however, they can hasten development and definitely cause bunions to hurt.   Bunions often run in families. We inherit a certain foot structure, which may be predisposed to bunion development.   Bunion pain is likely a combination of shoes rubbing on the bump, nerve irritation, compression between the toes, joint misalignment, arthritis and altered gait.   SYMPTOMS   Bunions are usually termed mild, moderate or severe. Just because you have a bunion does not mean you have to have pain. There are some people with very severe bunions and no pain and people with mild bunions and a lot of pain.   - Pain on the inside of your foot at the big toe joint (1st MTPJ)   - Swelling on the inside of your foot at the big toe joint   - Redness on the inside of your foot at the big toe joint   - Numbness or burning in the big toe (hallux)   - Decreased motion at the big toe joint   - Painful bursa (fluid-filled sac) on the inside of your foot at the big toe joint   - Pain while wearing shoes  -especially shoes too narrow or with high heels    - Pain during activities   - Corn in between the big toe and second toe   - Callous formation on the side or bottom of the big toe or big toe joint   - Callous under the second toe joint (2nd MTPJ)   - Pain in the second toe joint   TREATMENT  Conservative (non-surgical) treatment will not make the bunion go away, but it will hopefully decrease the signs and symptoms you have and help you get rid of the pain and get you back to your activities.   1.  Wider shoes or extra depth shoes: Most bunion pain can be improved simply by wearing compatible shoes. People with bunions cannot choose footwear simply because they like the style. Your bunion should determine which shoes are to be worn. Wide shoes with nonirritating seams,soft leather and a square toe box are most compatible with a bunion. Shoes should fit appropriately right out of the box but may need to be professionally stretched and modified to accommodate the bump. Heels, dress shoes and shoes with pointed toes will not be comfortable.   2. NSAIDs   3.  Arch supports, custom inserts, padding, splints, toe spacers : Most bunion pain can be improved simply by wearing compatible shoes. People with bunions cannot choose footwear simply because they like the style. Your bunion should determine which shoes are to be worn. Wide shoes with nonirritating seams,soft leather and a square toe box are most compatible with a bunion. Shoes should fit appropriately right out of the box but may need to be professionally stretched and modified to accommodate the bump. Heels, dress shoes and shoes with pointed toes will not be comfortable.   4.  Change activities   5.  Physical therapy  SURGERY  Surgical treatment for bunions is sometimes needed. If you are limited by pain, cannot fit in shoes comfortably and are not able to do your daily activities then surgery may be a good option for you. There are many different surgical  procedures to repair bunions. Your foot and ankle surgeon will review your foot exam findings, your x-rays, your age, your health, your lifestyle, your physical activity level and discuss with you which procedure he or she would recommend. Surgical procedures for bunions range from soft tissue repair to cutting and realigning the bones. It is not recommended that you have bunion surgery for cosmetic reasons (you do not like how your foot looks) or because you want to fit in a certain pair of shoes; There is the risk that even after surgery, the bunion will reoccur 9-10% of the time.   Bunion surgery involves cutting and repositioning the bones surrounding the bunion. Pins and screws are used to hold the bones in place during the healing process. The goal of bunion surgery is to reduce the size of the bunion bump. Realignment of the toe and joint is attempted.     Some first toes cannot be forced back into normal alignment even with surgery. Surgery is helpful in most cases but does not necessarily create a normal foot.   Healing after surgery requires about six weeks of protection. This allows the bone to heal. Maximum recovery takes about one year. The scar tissue and jOint structures require this amount of time to finish the healing process. Expect stiffness, swelling and numbness during that time frame. Bunion surgery does involve side effects. Some side effects are predictable and others are less common but do occur. A scar will be visible and could be irritated by shoes. The shoe may rub on the screw or internal pin requiring surgical removal of these fixation devices. The screw and pin would likely be left in place for a full year. The first toe may loose motion after bunion surgery. The amount of stiffness is variable. Some people never regain normal motion of the first toe. This is due to scar tissue inherent to any surgery. The first toe may drift toward the second toe or away from the second toe. Spreading  of the first and second toes is a rare occurrence after bunion surgery. This can be quite bothersome and would need to be surgically repaired. Toe drift toward the second toe could result in a recurrent bunion and revision surgery. Joint fusion is one option to correct an unstable, drifting toe. This procedure straightens the toe, however, no motion remains. Fusion may be necessary to correct complications of bunion surgery or as the original procedure in severe cases.   All surgical procedures involve risk of infection, numbness, pain, delayed healing, osteotomy dislocation, blood clots, continued foot pain, etc. Bunion surgery is quite complex and should not be taken lightly.   Any skin incision can lead to infection. Deep infection might involve the bone and thus repeat surgery and six weeks of IV antibiotics. Scar tissue can cause nerve pain or numbness. This is generally temporary but can be permanent. We do not have treatments that cure nerve problems. Second toe pain could be related to altered mechanics and pressure transferred to the second toe. Most feet with bunions have pre-existing second toe problems. Delayed bone healing would lengthen the healing time. Some bones simply do not heal. This requires repeat surgery, electronic bone stimulation and/or extended protection. Smokers have an approximate 20% chance of poor bone healing. This is double that of a non-smoker. The bone cut may displace. This may need to be repaired with a second operation. Displacement can cause jOint malalignment. Immobility after surgery can cause blood clots in the legs and lungs. This could result in death.   Foot pain is complex. Most feet hurt for more than one reason. Fixing the bunion would not necessarily create a pain free foot. Appropriate shoes, healthy body weight, avoidance of bare foot walking and moderation of activity will always be necessary to enjoy foot comfort. Your bunion may involve arthritis, which is  incurable even with surgery. Long standing bunions often involve chronic irritation to the surrounding nerves. Nerve pain may not resolve even with reducing the bunion bump since permanent nerve damage may be present   Bunion surgery is nevertheless quite successful. Most surgical patients are pleased with their foot following bunion surgery. Many of the issues described above can be controlled by taking proper care of your foot during the healing process.   Your surgeon would be happy to fully describe any of the above issues. You should pursue a full understanding of the operation,recovery process and any potential problems that could develop.   PREVENTION  1.  Do not wear high heels if there is a family history of bunions.  2.  Wear shoes that have enough width and depth in the toe box  Here are exercises that may benefit people with bunions:   Toe stretches - Stretching out your toes can help keep them limber and offset foot pain. To stretch your toes, point your toes straight ahead for 5 seconds and then curl them under for 5 seconds. Repeat these stretches 10 times. These exercises can be especially beneficial if you also have hammertoes, or chronically bent toes, in addition to a bunion.   Toe flexing and bambi - Press your toes against a hard surface such as a wall, to flex and stretch them; hold the position for 10 seconds and repeat three to four times. Then flex your toes in the opposite direction; hold the position for 10 seconds and repeat three to four times.   Stretching your big toe - Using your fingers to gently pull your big toe over into proper alignment can be helpful as well. Hold your toe in position for 10 seconds and repeat three to four times.   Resistance exercises - Wrap either a towel or belt around your big toe and use it to pull your big toe toward you while simultaneously pushing forward, against the towel, with your big toe.   Ball roll - To massage the bottom of your foot, sit  down, place a golf ball on the floor under your foot, and roll it around under your foot for two minutes. This can help relieve foot strain and cramping.   Towel curls - You can strengthen your toes by spreading out a small towel on the floor, curling your toes around it, and pulling it toward you. Repeat five times. Gripping objects with your toes like this can help keep your foot flexible.   Picking up marbles - Another gripping exercise you can perform to keep your foot flexible is picking up marbles with your toes. Do this by placing 20 marbles on the floor in front of you and use your foot to pick the marbles up one by one and place them in a bowl.   Walking along the beach - Whenever possible, spend time walking on sand. This can give you a gentle foot massage and also help strengthen your toes. This is especially beneficial for people who have arthritis associated with their bunions.     www.[employer]   1-800-PEDIFIX    NEUROPATHY  Peripheral neuropathy is damage of the peripheral nerves. Your peripheral nerves--the nerves in your toes and fingertips--are the ones on the periphery of your body. When the nerves are damaged, they don't function properly. People with peripheral neuropathy have decreased or abnormal sensation in their toes and fingers. Sometimes, they develop problems moving these parts of the body as well.    When a person develops neuropathy, they usually begin to feel numbness or tingling in their feet and sometime in their legs.  Other symptoms may include painful burning or hot feet, tingling or feeling like insects or ants are crawling on your feet or legs.      In the [place], the most common cause of peripheral neuropathy is diabetes. According to the American Diabetes Association, 60 to 70 percent of people with diabetes will develop neuropathy within their lifetime.  Other causes of peripheral neuropathy include:  Certain medications, including some chemotherapy drugs.    Heredity. Some people have a family history of peripheral neuropathy.   Advanced age. Peripheral neuropathy is more common as people age.   Arthritis. Certain type of arthritis can cause peripheral neuropathy.   Alcoholism. According to the [place] National Library of Medicine, up to half of all long-term heavy alcohol users develop peripheral neuropathy.   Neurological disorders. Certain neurological disorders, including spina bifida and fibromyalgia, are associated with peripheral neuropathy.   Injury. Acute injury to the peripheral nerves may also cause peripheral neuropathy.     Pain Management Strategies:  1.Blood Sugar Control - Most important in diabetics  2. Medications such as: Amytriptylline, duloxetine, gabapentin, lyrica, tramadol  3. Nutritional therapy: Vitamin B6 (100mg daily), Vitamin B12 (75mcg daily), Vitamin D 2000 IU daily), Alpha-Lipoic Acid (600-1800mg daily), Acetyl-L-Carnitine (500-1000mg TID, L-methyl folate (1500mcg daily)  4. TENS (Trans-electrical nerve stimulation) with physical therapy  5. Compounding pain creams    RECOMMENDATION TO AVOID PROBLEMS  1.Wash your feet with lukewarm water and a mild soap and then dry them thoroughly, especially between the toes.  2. Examine your feet daily looking for cuts, corns, blisters, cracks, ect., especially after wearing new shoes. Make sure to look between your toes. If you cannot see the bottom of your feet, set a mirror on the floor and hold your foot over it, or ask a spouse, friend or family member to examine your feet for you. Contact your doctor immediately if new problems are noted or if sores are not healing.  3. Immediately apply moisturizer to the tops and bottoms of your feet, avoiding areas between the toes. Hand lotion (Intesive Care, Sirena, Eucerin, Neutrogena, Curel, ect...) is sufficient unless your doctor prescribes a medicated lotion. Apply sunscreen to your feet when going swimming outside.  4. Use clean comfortable shoes, wear  white socks (if you have any bleeding or drainage, you will see it on white socks). Socks should not have thick seams or cut off the circulation around the leg. Break in new shoes slowly and rotate with older shoes until broken in. Check the inside of your shoes with your hand to look for areas of irritation or objects that may have fallen into your shoes.    5. Keep slippers by the side of your bed for use during the night.  6. Shoes should be fitted by a professional and should not cause areas of irritation.  Check your feet regularly when wearing a new pair of shoes and replace them as needed.  7. Cut your nails straight across, but then gently round any sharp edges with a cardboard nail file.  If you have neuropathy, peripheral vascular disease or cannot see that well to trim your own toenails, see a medical professional for care.  8. Taking Vitamin B6, B12, and Alpha Lipoic Acid supplements can sometimes help.    DO NOT  1.  Do not soak your feet if you have an open sore.  Use only lukewarm water and always check the temperature with your hand as hot water can easily burn your feet.    2.  Never use a hot water bottle or heating pad on your feet.  Also do not apply cold compresses to your feet.  With decreased sensation, you could burn or freeze your feet.    3. Do not apply any of these to your feet:   -  Over the counter medicine for corns or warts   -  Harsh chemicals like boric acid   -  Do not self-treat corns, cuts, blisters or infections. Always consult your doctor.  4.  Do not wear sandals, slippers or walk barefoot, especially on hot sand or concrete or other harsh surfaces.  5.  If you smoke, stop!     [facility]  [facility]  [address]  [place] [address]  Phone: [phone]  Fax: [phone] [facility]   [id number]  [place] [address]  Phone: [phone]  Fax: [phone]   [facility]   [facility]  [address]  [place] [address]  Phone: [phone]  Fax: [phone] [facility]  [address] [place] [address] [address]  [place] [address]  Phone: [phone]   Fax: [phone]   * Please call any location listed to make an appointment for a casting/fitting. Your referral was sent to their central office and they will all have the order on file.

## 2024-10-03 NOTE — TELEPHONE ENCOUNTER
RECORDS RECEIVED FROM: Care Everywhere   REASON FOR VISIT: Balance problems - 3 month follow up   PROVIDER: Mk Lazo DO   DATE OF APPT: 11/15/24 @ 1:15 pm    NOTES (FOR ALL VISITS) STATUS DETAILS   OFFICE NOTE from referring provider Care Everywhere 3/19/24, 9/29/23, 6/21/23 Kacie Phoenix MD @[facility]     OFFICE NOTE from other specialist Internal 8/16/24 Mk Lazo DO @[facility]     MEDICATION LIST Internal    IMAGING  (FOR ALL VISITS)     MRI (HEAD, NECK, SPINE) Internal [facility]  8/6/24 MR Thoracic Spine    [facility]  7/18/24 MR Lumbar Spine  7/18/24 MR Brain  7/18/24 MR Cervical Spine     CT (HEAD, NECK, SPINE) Internal [facility]  3/19/24 CT Sinus [place]

## 2024-10-14 ENCOUNTER — OFFICE VISIT (OUTPATIENT)
Dept: NEUROLOGY | Facility: CLINIC | Age: 79
End: 2024-10-14
Attending: PSYCHIATRY & NEUROLOGY
Payer: COMMERCIAL

## 2024-10-14 DIAGNOSIS — G62.9 NEUROPATHY: ICD-10-CM

## 2024-10-14 PROCEDURE — 95886 MUSC TEST DONE W/N TEST COMP: CPT | Performed by: PSYCHIATRY & NEUROLOGY

## 2024-10-14 PROCEDURE — 95885 MUSC TST DONE W/NERV TST LIM: CPT | Mod: 59 | Performed by: PSYCHIATRY & NEUROLOGY

## 2024-10-14 PROCEDURE — 95910 NRV CNDJ TEST 7-8 STUDIES: CPT | Performed by: PSYCHIATRY & NEUROLOGY

## 2024-10-14 NOTE — PROGRESS NOTES
[facility]  Electrodiagnostic Laboratory                 Department of Neurology                                                                                                         Test Date:  10/14/2024    Patient: Leola Marks : 1945 Physician: Marciano Deng MD   Sex: Female AGE: 79 year Ref Phys: Mk Lazo DO   ID#: [id number]   Technician: Aura Berry     History and Examination:  79 year old with several years of worsening balance. She also reports intermittent right leg and foot weakness. This study is being performed to investigate for radiculopathy vs focal neuropathy vs polyneuropathy.     Techniques:  Motor and sensory conduction studies were done with surface recording electrodes.  EMG was done with a concentric needle electrode.     Results:  Nerve conduction studies:   1. Bilateral sural sensory responses show normal CV and borderline amplitudes for age.   2. Left median-D2 and radial sensory responses are normal.  3. Bilateral peroneal-EDB and tibial-AH motor responses are normal.     Needle EMG of selected right and left lower limb muscles was performed as tabulated below. No abnormal spontaneous activity was observed in the sampled muscles. Motor unit potential morphology and recruitment patterns were normal.     Interpretation:  This is probably a normal study. There is no unequivocal electrophysiologic evidence of a large-fiber polyneuropathy or lumbosacral radiculopathy on the basis of this study. Although sural sensory responses are borderline in amplitude, this is likely a normal finding for age and does not constitute sufficient electrophysiologic evidence to diagnose neuropathy with certainty. Clinical correlation is recommended.     Marciano Deng MD  Department of Neurology        Nerve Conduction Studies  Motor Sites      Latency Neg. Amp Neg. Amp Diff Segment Distance Velocity Neg. Dur Neg Area Diff Temperature Comment   Site  (ms) Norm (mV) Norm (%)  cm m/s Norm (ms) (%) ( C)    Left Fibular (EDB) Motor   Ankle 4.6  < 6.0 2.6 -  Ankle-EDB 7.6   4.8  29.1    Right Fibular (EDB) Motor   Ankle 4.4  < 6.0 2.1 -  Ankle-EDB 8   4.4  30.4    Bel Fibular Head 10.7 - 1.84 - -12 Bel Fibular Head-Ankle 27.5 44  > 38 5.2 -11 30.9    Pop Fossa 12.3 - 1.77 - -4 Pop Fossa-Bel Fibular Head 8.3 52  > 38 5.1 -5 31.1    Left Tibial (AHB) Motor   Ankle 3.9  < 6.5 7.8  > 5.0  Ankle-AH 6.7   6.0  29.2    Right Tibial (AHB) Motor   Ankle 6.0  < 6.5 11.1  > 5.0  Ankle-AH 7.8   3.5  31.5    Knee 13.5 - 5.7 - -49 Knee-Ankle 34.6 46  > 38 5.4 -17 31.1      F-Wave Sites      Min F-Lat Max-Min F-Lat Mean F-Lat   Site (ms) (ms) (ms)   Right Fibular F-Wave   Ankle 75.4 22.2 -   Right Tibial F-Wave   Ankle 51.1 6.6 -     Sensory Sites      Onset Lat Peak Lat Amp (O-P) Amp (P-P) Segment Distance Velocity Temperature Comment   Site ms (ms)  V Norm ( V)  cm m/s Norm ( C)    Left Median Sensory   Wrist-Dig II 2.5 3.3 14  > 10 38 Wrist-Dig II 14 56  > 48 -    Left Radial Sensory   Forearm-Wrist 1.78 2.4 16  > 15 29 Forearm-Wrist 10 56 - -    Left Sural Sensory   Calf-Lat Malleolus 2.6 3.5 5  > 5 2 Calf-Lat Malleolus 12 46  > 38 29.6    Right Sural Sensory   Calf-Lat Malleolus 2.9 3.5 5  > 5 8 Calf-Lat Malleolus - -  > 38 31.3        Electromyography     Side Muscle Ins Act Fibs/PSW Fasc HF Amp Dur Poly Recrt Int Pat   Right Vastus lat Nml None Nml 0 Nml Nml 0 Nml Nml   Right Tib ant Nml None Nml 0 Nml Nml 0 Nml Nml   Right Gastroc Nml None Nml 0 Nml Nml 0 Nml Nml   Right Tib post Nml None Nml 0 Nml Nml 0 Nml Nml   Right Fib longus Nml None Nml 0 Nml Nml 0 Nml Nml   Left Tib ant Nml None Nml 0 Nml Nml 0 Nml Nml   Left Gastroc Nml None Nml 0 Nml Nml 0 Nml Nml         NCS Waveforms:    Motor                Sensory                F-Wave           Ultrasound Images:

## 2024-10-14 NOTE — LETTER
10/14/2024       RE: Leola Marks  [address]  [facility] [address]     Dear Colleague,    Thank you for referring your patient, Leola Marks, to the [facility] EMG CLINIC [place] at [facility]. Please see a copy of my visit note below.                        [facility]  Electrodiagnostic Laboratory                 Department of Neurology                                                                                                         Test Date:  10/14/2024    Patient: Leola Marks : 1945 Physician: Marciano Deng MD   Sex: Female AGE: 79 year Ref Phys: Mk Longarchana, DO   ID#: [id number]   Technician: Aura Smart     History and Examination:  79 year old with several years of worsening balance. She also reports intermittent right leg and foot weakness. This study is being performed to investigate for radiculopathy vs focal neuropathy vs polyneuropathy.     Techniques:  Motor and sensory conduction studies were done with surface recording electrodes.  EMG was done with a concentric needle electrode.     Results:  Nerve conduction studies:   1. Bilateral sural sensory responses show normal CV and borderline amplitudes for age.   2. Left median-D2 and radial sensory responses are normal.  3. Bilateral peroneal-EDB and tibial-AH motor responses are normal.     Needle EMG of selected right and left lower limb muscles was performed as tabulated below. No abnormal spontaneous activity was observed in the sampled muscles. Motor unit potential morphology and recruitment patterns were normal.     Interpretation:  This is probably a normal study. There is no unequivocal electrophysiologic evidence of a large-fiber polyneuropathy or lumbosacral radiculopathy on the basis of this study. Although sural sensory responses are borderline in amplitude, this is likely a normal finding for age and does not constitute  sufficient electrophysiologic evidence to diagnose neuropathy with certainty. Clinical correlation is recommended.     Marciano Deng MD  Department of Neurology        Nerve Conduction Studies  Motor Sites      Latency Neg. Amp Neg. Amp Diff Segment Distance Velocity Neg. Dur Neg Area Diff Temperature Comment   Site (ms) Norm (mV) Norm (%)  cm m/s Norm (ms) (%) ( C)    Left Fibular (EDB) Motor   Ankle 4.6  < 6.0 2.6 -  Ankle-EDB 7.6   4.8  29.1    Right Fibular (EDB) Motor   Ankle 4.4  < 6.0 2.1 -  Ankle-EDB 8   4.4  30.4    Bel Fibular Head 10.7 - 1.84 - -12 Bel Fibular Head-Ankle 27.5 44  > 38 5.2 -11 30.9    Pop Fossa 12.3 - 1.77 - -4 Pop Fossa-Bel Fibular Head 8.3 52  > 38 5.1 -5 31.1    Left Tibial (AHB) Motor   Ankle 3.9  < 6.5 7.8  > 5.0  Ankle-AH 6.7   6.0  29.2    Right Tibial (AHB) Motor   Ankle 6.0  < 6.5 11.1  > 5.0  Ankle-AH 7.8   3.5  31.5    Knee 13.5 - 5.7 - -49 Knee-Ankle 34.6 46  > 38 5.4 -17 31.1      F-Wave Sites      Min F-Lat Max-Min F-Lat Mean F-Lat   Site (ms) (ms) (ms)   Right Fibular F-Wave   Ankle 75.4 22.2 -   Right Tibial F-Wave   Ankle 51.1 6.6 -     Sensory Sites      Onset Lat Peak Lat Amp (O-P) Amp (P-P) Segment Distance Velocity Temperature Comment   Site ms (ms)  V Norm ( V)  cm m/s Norm ( C)    Left Median Sensory   Wrist-Dig II 2.5 3.3 14  > 10 38 Wrist-Dig II 14 56  > 48 -    Left Radial Sensory   Forearm-Wrist 1.78 2.4 16  > 15 29 Forearm-Wrist 10 56 - -    Left Sural Sensory   Calf-Lat Malleolus 2.6 3.5 5  > 5 2 Calf-Lat Malleolus 12 46  > 38 29.6    Right Sural Sensory   Calf-Lat Malleolus 2.9 3.5 5  > 5 8 Calf-Lat Malleolus - -  > 38 31.3        Electromyography     Side Muscle Ins Act Fibs/PSW Fasc HF Amp Dur Poly Recrt Int Pat   Right Vastus lat Nml None Nml 0 Nml Nml 0 Nml Nml   Right Tib ant Nml None Nml 0 Nml Nml 0 Nml Nml   Right Gastroc Nml None Nml 0 Nml Nml 0 Nml Nml   Right Tib post Nml None Nml 0 Nml Nml 0 Nml Nml   Right Fib longus Nml None Nml 0 Nml Nml 0 Nml  Nml   Left Tib ant Nml None Nml 0 Nml Nml 0 Nml Nml   Left Gastroc Nml None Nml 0 Nml Nml 0 Nml Nml         NCS Waveforms:    Motor                Sensory                F-Wave           Ultrasound Images:            Again, thank you for allowing me to participate in the care of your patient.      Sincerely,    Marciano Deng MD

## 2024-11-11 ENCOUNTER — HOSPITAL ENCOUNTER (OUTPATIENT)
Facility: CLINIC | Age: 79
End: 2024-11-11
Payer: COMMERCIAL

## 2024-11-12 ENCOUNTER — TELEPHONE (OUTPATIENT)
Dept: MEDSURG UNIT | Facility: CLINIC | Age: 79
End: 2024-11-12
Payer: COMMERCIAL

## 2024-11-15 ENCOUNTER — VIRTUAL VISIT (OUTPATIENT)
Dept: NEUROLOGY | Facility: CLINIC | Age: 79
End: 2024-11-15
Payer: COMMERCIAL

## 2024-11-15 VITALS — BODY MASS INDEX: 22.68 KG/M2 | WEIGHT: 128 LBS | HEIGHT: 63 IN

## 2024-11-15 DIAGNOSIS — M54.16 LUMBAR NERVE ROOT COMPRESSION: Primary | ICD-10-CM

## 2024-11-15 ASSESSMENT — PAIN SCALES - GENERAL: PAINLEVEL_OUTOF10: NO PAIN (0)

## 2024-11-15 NOTE — PROGRESS NOTES
Virtual Visit Details    Type of service:  Video Visit   Video Start Time:  1258  Video End Time:1:51 PM    Originating Location (pt. Location): Home    Distant Location (provider location):  On-site  Platform used for Video Visit: Ruthann

## 2024-11-15 NOTE — LETTER
11/15/2024       RE: Leola Marks  [address]  [facility] [address]     Dear Colleague,    Thank you for referring your patient, Leola Marks, to the [facility] NEUROLOGY CLINIC [place] at [facility]. Please see a copy of my visit note below.    [facility] Neurology Follow Up Visit    Leola Marks MRN# [id number]   Age: 79 year old YOB: 1945     Brief history of symptoms: The patient was initially seen in neurologic consultation on 8/16/2024 for evaluation of neuropathy. Please see the comprehensive neurologic consultation notes from those dates in the Epic records for details.     Pertinent medical history included:  - MRI brain 5/23/2023 was relatively normal, mild changes of aging of microvascular ischemic disease  - Lumbar DDD with prior suspicion of L4/5  - UE EMG 8/5/2023 was normal (for recurrent right arm tingling)    At our visit, there were findings on exam of distal symmetric sensory loss, and by her description of pain there may have been/still present an L4 radiculopathy.  The patient was also reassured that her imaging of the past was not consistent with MS or demyelinating disorders.    Interval history:   - B6 was mildly-moderately elevated (220.  Normal is ) ~ patient was contacted and asked to reduce dietary or supplementary intake an recheck levels over time.  - MRI brain 8/6/2024 (in PACS, but actual imaging from 7/18/2024) ~ presumed chronic microvascular ischemic changes in cerebral white matter, no change from 5/23/2023.  - MRI cervical spine 8/6/2024 ~ no specific white matter lesions.  Degenerative fusion across markedly hypertrophic left facet joints. No high grade central canal stenosis, but disc margins abut ventral cord at C3-7.  - MRI lumbar spine 8/6/2024 ~ severe narrowing of central spinal canal at L4-5 and severe narrowing of left foraminal with michael impingement of left subarticular  region leading to impingement of left L5 root as it exits sac.  - EMG 10/14/2024 was thought to be normal.  Although, sural sensory responses were borderline for the patient's age.  Clinical correlation advised.    Today, the patient indicates that her right foot issues at times worsens with tingling when feeling light-headed during walking.  She does note that most of her symptoms can happen when sitting prolonged periods of time.  She can hardly walk at times after sitting prolonged periods of time.  Her weakness and sensory issues improve over time though.       Physical Exam:   General: Seated comfortably in no acute distress.  Neurologic:     Mental Status: Fully alert, attentive and oriented. Speech clear and fluent, no paraphasic errors.     Cranial Nerves: EOMI with normal smooth pursuit. Facial movements symmetric. Hearing not formally tested but intact to conversation.  No dysarthria.     Motor: No tremors or other abnormal movements observed.          Assessment and Plan:   Assessment:  Suspect lumbar spinal stenosis and rootlet compression   - hx of transient foot drop but negative EMG  Cervical spondylolysis with ventral cord abutment   - hx of transient wrist drop but negative EMG    The patient has notable clinical history of lumbar flexion leading to b/l leg (R>L) weakness, sensory loss and some pain.  With her EMG being negative, and her imaging of the lumbar spine showing multiple regions of nerve root impingement due to severe neural-foramina narrowing along with spinal canal stenosis, I suspect she may have rootlet compression as well as cord compression occurring transiently in seated positions.   This may be better addressed through a spinal , and I will place this order today.    Her MRI brain did show signs of white matter changes, and some are w/in the periventricular regions.  There was no change in findings compared to 2023.  At this point, I do not feel strongly the patient has  active MS, and more so that her urinary related issues are caused by a MS lesion of the thoracic or lumbar spine.  Repeat imaging could always be considered, and if new lesions are noted on imaging then LP may be considered as this would aide in diagnosis.    Plan:  - consider treatment with orthopedics for possible lumbar and cervical regions  - continue with plans for urology management     Follow up in Neurology clinic as needed should new concerns arise.    SAMUEL Lazo D.O.   of Neurology    Total time today (55 min) in this patient encounter was spent on pre-charting, counseling and/or coordination of care.       Virtual Visit Details    Type of service:  Video Visit   Video Start Time:  1258  Video End Time:1:51 PM    Originating Location (pt. Location): Home    Distant Location (provider location):  On-site  Platform used for Video Visit: Ruthann      Again, thank you for allowing me to participate in the care of your patient.      Sincerely,    Mk Lazo, DO

## 2024-11-15 NOTE — NURSING NOTE
Current patient location: [address]   [facility] [address]    Is the patient currently in the state of [place]? YES    Visit mode:VIDEO    If the visit is dropped, the patient can be reconnected by:VIDEO VISIT: Text to cell phone:   Telephone Information:   Mobile [phone]       Will anyone else be joining the visit? NO  (If patient encounters technical issues they should call [phone]    Are changes needed to the allergy or medication list? No    Are refills needed on medications prescribed by this physician? NO    Rooming Documentation:  Questionnaire(s) not pre-assigned    Reason for visit: Consult    Jewell MCGUIRE

## 2024-11-15 NOTE — PROGRESS NOTES
[facility] Neurology Follow Up Visit    Leola Marks MRN# [id number]   Age: 79 year old YOB: 1945     Brief history of symptoms: The patient was initially seen in neurologic consultation on 8/16/2024 for evaluation of neuropathy. Please see the comprehensive neurologic consultation notes from those dates in the Epic records for details.     Pertinent medical history included:  - MRI brain 5/23/2023 was relatively normal, mild changes of aging of microvascular ischemic disease  - Lumbar DDD with prior suspicion of L4/5  - UE EMG 8/5/2023 was normal (for recurrent right arm tingling)    At our visit, there were findings on exam of distal symmetric sensory loss, and by her description of pain there may have been/still present an L4 radiculopathy.  The patient was also reassured that her imaging of the past was not consistent with MS or demyelinating disorders.    Interval history:   - B6 was mildly-moderately elevated (220.  Normal is ) ~ patient was contacted and asked to reduce dietary or supplementary intake an recheck levels over time.  - MRI brain 8/6/2024 (in PACS, but actual imaging from 7/18/2024) ~ presumed chronic microvascular ischemic changes in cerebral white matter, no change from 5/23/2023.  - MRI cervical spine 8/6/2024 ~ no specific white matter lesions.  Degenerative fusion across markedly hypertrophic left facet joints. No high grade central canal stenosis, but disc margins abut ventral cord at C3-7.  - MRI lumbar spine 8/6/2024 ~ severe narrowing of central spinal canal at L4-5 and severe narrowing of left foraminal with michael impingement of left subarticular region leading to impingement of left L5 root as it exits sac.  - EMG 10/14/2024 was thought to be normal.  Although, sural sensory responses were borderline for the patient's age.  Clinical correlation advised.    Today, the patient indicates that her right foot issues at times worsens with tingling when feeling light-headed  during walking.  She does note that most of her symptoms can happen when sitting prolonged periods of time.  She can hardly walk at times after sitting prolonged periods of time.  Her weakness and sensory issues improve over time though.       Physical Exam:   General: Seated comfortably in no acute distress.  Neurologic:     Mental Status: Fully alert, attentive and oriented. Speech clear and fluent, no paraphasic errors.     Cranial Nerves: EOMI with normal smooth pursuit. Facial movements symmetric. Hearing not formally tested but intact to conversation.  No dysarthria.     Motor: No tremors or other abnormal movements observed.          Assessment and Plan:   Assessment:  Suspect lumbar spinal stenosis and rootlet compression   - hx of transient foot drop but negative EMG  Cervical spondylolysis with ventral cord abutment   - hx of transient wrist drop but negative EMG    The patient has notable clinical history of lumbar flexion leading to b/l leg (R>L) weakness, sensory loss and some pain.  With her EMG being negative, and her imaging of the lumbar spine showing multiple regions of nerve root impingement due to severe neural-foramina narrowing along with spinal canal stenosis, I suspect she may have rootlet compression as well as cord compression occurring transiently in seated positions.   This may be better addressed through a spinal , and I will place this order today.    Her MRI brain did show signs of white matter changes, and some are w/in the periventricular regions.  There was no change in findings compared to 2023.  At this point, I do not feel strongly the patient has active MS, and more so that her urinary related issues are caused by a MS lesion of the thoracic or lumbar spine.  Repeat imaging could always be considered, and if new lesions are noted on imaging then LP may be considered as this would aide in diagnosis.    Plan:  - consider treatment with orthopedics for possible lumbar and  cervical regions  - continue with plans for urology management     Follow up in Neurology clinic as needed should new concerns arise.    SAMUEL Lazo D.O.   of Neurology    Total time today (55 min) in this patient encounter was spent on pre-charting, counseling and/or coordination of care.

## 2024-11-20 ENCOUNTER — TELEPHONE (OUTPATIENT)
Dept: ANESTHESIOLOGY | Facility: CLINIC | Age: 79
End: 2024-11-20
Payer: COMMERCIAL

## 2024-11-20 NOTE — TELEPHONE ENCOUNTER
Patient confirmed scheduled appointment:     Date: 11/22/24  Time: 1:30 PM  Visit type: New Med Spine Physician  Visit mode: In Person  Provider: Dr. Gerson Torre  [place] [facility]    Additional Notes: Rescheduled from 12/24/24. arash

## 2024-11-25 ENCOUNTER — TELEPHONE (OUTPATIENT)
Dept: ANESTHESIOLOGY | Facility: CLINIC | Age: 79
End: 2024-11-25
Payer: COMMERCIAL

## 2024-11-25 PROBLEM — M54.16 LUMBAR RADICULOPATHY, CHRONIC: Status: ACTIVE | Noted: 2024-11-22

## 2024-11-25 NOTE — TELEPHONE ENCOUNTER
RN reviewed patient chart. Pre procedure instructions were reviewed with patient.    Lala Ureña RNCC

## 2024-12-02 ENCOUNTER — TELEPHONE (OUTPATIENT)
Dept: ANESTHESIOLOGY | Facility: CLINIC | Age: 79
End: 2024-12-02
Payer: COMMERCIAL

## 2024-12-02 NOTE — TELEPHONE ENCOUNTER
RN returned call and spoke with patient. Writer answered patient's questions about the procedure arrival time, length of procedure and recovery timeframe. Patient verbalized understanding and will have a  arranged. Patient will hold her Aspirin for 6 days prior to procedure and also informed she is on Keflex antibiotic for prophylactic for UTI's. Patient states she does not have an active infection and takes this for preventative. Patient appreciated the call and had no further questions.     Kathy Garcia RN

## 2024-12-02 NOTE — TELEPHONE ENCOUNTER
[facility] Call Center    Phone Message    May a detailed message be left on voicemail: yes     Reason for Call: Other: Pt is calling and wanting to know a few things before she has her injections on 12/19/2024. Please call Pt to discuss.       Action Taken: Message routed to:  [facility] Pain    Travel Screening: Not Applicable     Date of Service:

## 2025-05-07 ENCOUNTER — ORDERS ONLY (AUTO-RELEASED) (OUTPATIENT)
Dept: NEUROLOGY | Facility: CLINIC | Age: 80
End: 2025-05-07

## 2025-05-07 ENCOUNTER — OFFICE VISIT (OUTPATIENT)
Dept: NEUROLOGY | Facility: CLINIC | Age: 80
End: 2025-05-07
Payer: COMMERCIAL

## 2025-05-07 VITALS
RESPIRATION RATE: 16 BRPM | OXYGEN SATURATION: 100 % | SYSTOLIC BLOOD PRESSURE: 174 MMHG | DIASTOLIC BLOOD PRESSURE: 97 MMHG | HEART RATE: 63 BPM

## 2025-05-07 DIAGNOSIS — R55 PRE-SYNCOPE: Primary | ICD-10-CM

## 2025-05-07 DIAGNOSIS — R55 PRE-SYNCOPE: ICD-10-CM

## 2025-05-07 NOTE — NURSING NOTE
Chief Complaint   Patient presents with    Follow Up     Patient states her BP is not usually this high.  Pt was rushing to get here, with construction adding to the increased number.  Provider advised prior to visit.    Morelia RICHEYP

## 2025-05-07 NOTE — PATIENT INSTRUCTIONS
I think your dizziness sounds like pre-syncope.  I suspect this is from poor vascular compliance.  However, I would like you to obtain further testing with looking at your blood vessel studies of the head and neck, as well as with a cardiac monitor.   - CTA head and neck w/wout contrast  - Ziopatch 14 days

## 2025-05-07 NOTE — LETTER
5/7/2025       RE: Leola Marks  [address]  [facility] [address]     Dear Colleague,    Thank you for referring your patient, Leola Marks, to the [facility] NEUROLOGY CLINIC [place] at [facility]. Please see a copy of my visit note below.    [facility] Neurology Follow Up Visit    Leola Marks MRN# [id number]   Age: 79 year old YOB: 1945            Assessment and Plan:   Assessment:  Pre-syncope     The patient describes dizziness and light-headedness with standing from numerous occasions, but no issues of room spinning dizziness or passing out entirely are of mention.  Overall, her exam toady doesn't show clear signs of vertigo or cerebellar dysfunction.  I would want her to pursue ziopatch monitoring, and getting a detailed CTA head and neck to look for causes of syncope.  If negative, then perhaps this could be related to her hyponatremia and fluid intake/[id number].     Plan:  - Keep attending PT for hesitant gait and variable sensory symptoms of the legs   - continue to use sticks for imbalance  - CTA head and neck w/wout contrast  - Ziopatch 14 days    Follow up in Neurology clinic in 3- 6 months (virtual) or earlier as needed should new concerns arise.    SAMUEL Lazo D.O.   of Neurology    Total time today (35 min) in this patient encounter was spent on pre-charting, counseling and/or coordination of care.      Prior History and Update:   The patient was initially seen in neurologic consultation on 8/16/2024 and most recently 11/15/2024 for evaluation of neuropathy. Please see the comprehensive neurologic consultation notes from those dates in the Epic records for details.     Pertinent medical history included:  - MRI brain 5/23/2023 was relatively normal, mild changes of aging of microvascular ischemic disease  - Lumbar DDD with prior suspicion of L4/5  - UE EMG 8/5/2023 was normal (for  recurrent right arm tingling)  - B6 was mildly-moderately elevated (220.  Normal is ) ~ patient was contacted and asked to reduce dietary or supplementary intake an recheck levels over time.  - MRI brain 8/6/2024 (in PACS, but actual imaging from 7/18/2024) ~ presumed chronic microvascular ischemic changes in cerebral white matter, no change from 5/23/2023.  - MRI cervical spine 8/6/2024 ~ no specific white matter lesions.  Degenerative fusion across markedly hypertrophic left facet joints. No high grade central canal stenosis, but disc margins abut ventral cord at C3-7.  - MRI lumbar spine 8/6/2024 ~ severe narrowing of central spinal canal at L4-5 and severe narrowing of left foraminal with michael impingement of left subarticular region leading to impingement of left L5 root as it exits sac.  - EMG 10/14/2024 was thought to be normal.  Although, sural sensory responses were borderline for the patient's age.  Clinical correlation advised.    Overall impression following our last visit was that the patient had no clear neuropathy, but may have both lumbar radiculopathy leading to transient foot drop and negative EMG, as well as possible cervical spondylosis leading to ventral cord abutment and leading to transient motor deficits (hand/leg).  An orthopedics consultation was made give the image findings but negative EMG.    Today, the patient has attended a PT and this has helped with improving her sensory issues of the feet, and some of her imbalance.  She didn't have an JASS as this was not thought to provider her with much improvement of her sensory symptoms.  Today, she mentions having dizziness with standing from a seated position at times. This has led her to be hesitant with exercise.  He also noted some dizziness with movements like dancing at a concert (light-headed, not really world turning around, weirdness sensation).      Physical Exam:   General: Seated comfortably in no acute  distress.  Neurologic:     Mental Status: Fully alert, attentive and oriented. Speech clear and fluent, no paraphasic errors.     Cranial Nerves: EOMI with normal smooth pursuit. Facial movements symmetric. Hearing not formally tested but intact to conversation.  No dysarthria.     Motor: No tremors or other abnormal movements observed.       Again, thank you for allowing me to participate in the care of your patient.      Sincerely,    Mk Lazo, DO

## 2025-05-28 LAB — CV ZIO PRELIM RESULTS: NORMAL

## 2025-05-29 DIAGNOSIS — R55 PRE-SYNCOPE: Primary | ICD-10-CM

## 2025-06-02 ENCOUNTER — PATIENT OUTREACH (OUTPATIENT)
Dept: CARE COORDINATION | Facility: CLINIC | Age: 80
End: 2025-06-02
Payer: COMMERCIAL

## 2025-06-05 ENCOUNTER — ANCILLARY PROCEDURE (OUTPATIENT)
Dept: CT IMAGING | Facility: CLINIC | Age: 80
End: 2025-06-05
Attending: PSYCHIATRY & NEUROLOGY
Payer: COMMERCIAL

## 2025-06-05 DIAGNOSIS — R55 PRE-SYNCOPE: ICD-10-CM

## 2025-06-05 LAB
CREAT BLD-MCNC: 1 MG/DL (ref 0.5–1)
EGFRCR SERPLBLD CKD-EPI 2021: 57 ML/MIN/1.73M2

## 2025-06-05 PROCEDURE — 70496 CT ANGIOGRAPHY HEAD: CPT

## 2025-06-05 PROCEDURE — 250N000009 HC RX 250: Performed by: PSYCHIATRY & NEUROLOGY

## 2025-06-05 PROCEDURE — 250N000011 HC RX IP 250 OP 636: Performed by: PSYCHIATRY & NEUROLOGY

## 2025-06-05 PROCEDURE — 82565 ASSAY OF CREATININE: CPT

## 2025-06-05 PROCEDURE — 70498 CT ANGIOGRAPHY NECK: CPT

## 2025-06-05 RX ORDER — IOPAMIDOL 755 MG/ML
67 INJECTION, SOLUTION INTRAVASCULAR ONCE
Status: COMPLETED | OUTPATIENT
Start: 2025-06-05 | End: 2025-06-05

## 2025-06-05 RX ADMIN — SODIUM CHLORIDE 70 ML: 9 INJECTION, SOLUTION INTRAVENOUS at 07:44

## 2025-06-05 RX ADMIN — IOPAMIDOL 67 ML: 755 INJECTION, SOLUTION INTRAVENOUS at 07:43

## 2025-07-09 NOTE — TELEPHONE ENCOUNTER
Called patient to schedule procedure with Dr. Torre    Date of Procedure: 12/19/24    Arrival time given: Yes: Arrival Time 11am       Procedure Location: [facility]     Verified Location with Patient:  Yes  Address provided to the patient     Pre-op H&P Required:  No: Local anesthesia        Post-Op/Follow Up Appt:  Not Indicated in Request      Informed patient they will need a  to drive them home:  Yes    Patients : Spouse     Patient is aware that pre-op RN from the procedure center will call 2-3 days prior to scheduled procedure to confirm arrival time and review any instructions:  Yes       Additional Comments: N/A        Linnette Baig MA on 11/25/2024 at 9:30 AM      P: [phone]*    Please remind pt he is due for f-u DM2 appt in late July 2025 (he needs 3 month f-u after last OV was in 4/2025)    Thanks.